# Patient Record
(demographics unavailable — no encounter records)

---

## 2025-07-09 NOTE — PHYSICAL EXAM
[No Acute Distress] : no acute distress [Obese] : obese [Normal Venous Pressure] : normal venous pressure [Normal S1, S2] : normal S1, S2 [Murmur] : murmur [Clear Lung Fields] : clear lung fields [Abnormal Gait] : abnormal gait [No Edema] : no edema

## 2025-07-09 NOTE — REASON FOR VISIT
[Hyperlipidemia] : hyperlipidemia [Hypertension] : hypertension [FreeTextEntry1] : 77-year-old comes in from [facility] after recent hospital admission at [facility].  For syncopal event x 2.  This was after poor food and water intake for 4 days.  Months of dizziness and vertigo with nystagmus.  She was febrile.  Mildly anemic hypokalemic and hypomagnesemic with elevated renal function.  And had elevation of troponin which was felt to be demand ischemia. Noted with atrial fibrillation.  Had ZACHARY guided DCCV.  ZACHARY showed preserved EF.  No significant high risk valvular abnormality.  No left atrial thrombus. She was placed on Eliquis metoprolol 100 mg and digoxin.  She had a DCCV with normal sinus rhythm.  She received furosemide for edema.  Now in rehab gradually getting better.  Slowly walking with a walker. Today in the office with her .  No chest pain or palpitations.  No unusual shortness of breath.  No PND orthopnea.  No pedal edema.  No significant dizziness or lightheadedness.  Her medical history includes Essential hypertension Dyslipidemia PSVT  No prior history of myocardial infarction, coronary artery disease, cerebrovascular accident, peripheral artery disease, rheumatic fever, thyroid or Lyme disease.

## 2025-07-09 NOTE — CARDIOLOGY SUMMARY
[de-identified] : July 29, 2024.  Normal sinus rhythm poor R wave progression. July 9, 2025.  Normal sinus rhythm. Reviewed EKGs from the office showing atrial fibrillation/flutter with variable rate. [de-identified] : Event monitor showed brief episodes of PSVT, PVCs. [de-identified] : August 8, 2024.  Pharmacological myocardial perfusion scan.  No significant ischemia.  LVEF 77%. [de-identified] : July 29, 2024 EF 60 to 65%.  Borderline LVH. ZACHARY June 2025.  Preserved EF.  EF around 60 to 65% mild to moderate MR left atrial appendage without any thrombus no PFO on agitated saline. [de-identified] : CTA and MRA of the head and neck no significant occlusive diseaseJune 2025 [de-identified] : DCCV.  June 2025. [de-identified] : Bilateral carotid Doppler study,2025.  Nonobstructive

## 2025-07-09 NOTE — ASSESSMENT
[FreeTextEntry1] : Reviewed on July 9, 2025. Cardiology and EP notes reviewed EKGs from today and [facility] reviewed. Labs reviewed. CT A/MRA of head and neck MRA of head and neck reviewed no large vessel occlusion or major stenosis. CBC has shown mild anemia.  Sodium was 142 potassium 3.6 creatinine 0.9 subsequently.

## 2025-07-09 NOTE — DISCUSSION/SUMMARY
[FreeTextEntry1] : 77-year-old white female with above medical history active medical problems as noted below 1.  Paroxysmal atrial fibrillation.  Status post ZACHARY guided DCCV.  At present normal sinus rhythm.  On anticoagulation.  High risk medication use.  Watch for bleeding complication.  So far no complication. Discontinue digoxin.  Continue with metoprolol 100 mg. Eliquis 5 mg twice daily. Repeat labs. Follow-up weight and use of furosemide and potassium for any weight gain or significant edema. Pathophysiology reviewed with  and wife. If recurrent arrhythmias specifically atrial fibrillation consider atrial fibrillation ablation. 2.  Essential hypertension.  No clinical signs of CHF.  No signs history of CKD.  Non-smoker.  Stable.   continue metoprolol.  Off ARB. Long-term goal less than 130/80. 3.  Dyslipidemia.    Restart atorvastatin.  Follow lipid panel.  Aim LDL less than 70. If any recurrent further symptoms recommended her to call 911 and go to nearest emergency room. #4  While in the hospital elevated troponin.  Felt to be demand ischemia.  Once remained stable in the month considering no unstable symptoms and EKGs without ischemia we will consider noninvasive evaluation for ischemic heart disease.  If there is any change in symptoms she will contact us and that case we may need to do urgent evaluation management. 5.  Carotid nonobstructive disease bilaterally.  Continue with statin therapy.  Counseling regarding low saturated fat, salt and carbohydrate intake was reviewed. Active lifestyle and regular. Exercise along with weight management is advised. All the above were at length reviewed. Answered all the questions. Thank you very much for this kind referral. Please do not hesitate to give me a call for any question. Part of this transcription was done with voice recognition software and phonetically similar errors are common. I apologize for that. Please do not hesitate to call for any questions due to above.  Sincerely,  Idalia Cardenas MD, FAC, NICHOLAS [EKG obtained to assist in diagnosis and management of assessed problem(s)] : EKG obtained to assist in diagnosis and management of assessed problem(s)